# Patient Record
(demographics unavailable — no encounter records)

---

## 2025-07-28 NOTE — HISTORY OF PRESENT ILLNESS
[Home] : at home, [unfilled] , at the time of the visit. [Other Location: e.g. Home (Enter Location, City,State)___] : at [unfilled] [This encounter was initiated by telehealth (audio with video) and converted to telephone (audio only)] : This encounter was initiated by telehealth (audio with video) and converted to telephone (audio only) [Technical] : patient unable to effectively utilize tele-video due to technical issues. [Verbal consent obtained from patient] : the patient, [unfilled] [de-identified] : [facility] VISIT  Ms. Allred is a 57 y/o woman who is being seen today for [facility]. She was recently hospitalized at [facility] from 7/22-7/25/25 for dyspnea >7 days. Admitted and treated for acute on chronic respiratory failure and acute on chronic heart failure. Pulmonology and cardiology consulted: symptoms improved with steroids, oxygen, and a diuretic. Of note, she was found to have hepatomegaly on abdominal US. Discharged home with [facility].  Ms. Allred appears comfortable sitting down, oxygen in place and no apparent distress.  She resides alone with HHA assistance for 2-3 hours on Tuesday and Thursday only. She is disabled and depends on HHA and her Cousin's assistance Denies any chest pain, increased shortness of breath, increased cough/sputum, fever/chills, increased fatigue and/or worsening symptoms of depression.   Medical conditions: #Asthma- diagnosed as a child. Denies any intubations. On albuterol MDI/NEB; denies increased use of rescue medications. Her pulmonologist, Dr. Hernandez. Last PFTs in 10/2023.  #Depression- she reports it as severe. Increased to sertraline 100mg daily, tolerating well. Looking in CBT  #Fall- recent fall- tripped going up the stairs and also fell in the home; isolated events.  #COPD on 3L NC #HFpEF- on lasix BID. Barriers to adherence with diet: financial. Has a working scale in the home and weighs daily.  Follow up with sari, Dr. Herndon #DM- newly diagnosed. A1c 6.3%. On metformin BID. FS this Am- 107.

## 2025-07-28 NOTE — HEALTH RISK ASSESSMENT
[No] : No [Any fall with injury in past year] : Patient reported fall with injury in the past year [Assistive Device] : Patient uses an assistive device [de-identified] : unable to fully assess; she states she is doing well right now.

## 2025-07-28 NOTE — ASSESSMENT
[FreeTextEntry1] : Patient is dealing with financial issues such as facing eviction from appointment and also low SNAP benefits. discussed options for her to talk with her PCP regarding [facility].  Continue the use of walker follow up with recommended behavioral health specialists to start therapy.

## 2025-07-28 NOTE — HEALTH RISK ASSESSMENT
[No] : No [Any fall with injury in past year] : Patient reported fall with injury in the past year [Assistive Device] : Patient uses an assistive device [de-identified] : unable to fully assess; she states she is doing well right now.

## 2025-07-28 NOTE — PHYSICAL EXAM
[No Acute Distress] : no acute distress [Well-Appearing] : well-appearing [Normal Voice/Communication] : normal voice/communication [Normal Affect] : the affect was normal [Alert and Oriented x3] : oriented to person, place, and time [Normal Insight/Judgement] : insight and judgment were intact

## 2025-07-28 NOTE — HISTORY OF PRESENT ILLNESS
[Home] : at home, [unfilled] , at the time of the visit. [Other Location: e.g. Home (Enter Location, City,State)___] : at [unfilled] [This encounter was initiated by telehealth (audio with video) and converted to telephone (audio only)] : This encounter was initiated by telehealth (audio with video) and converted to telephone (audio only) [Technical] : patient unable to effectively utilize tele-video due to technical issues. [Verbal consent obtained from patient] : the patient, [unfilled] [de-identified] : [facility] VISIT  Ms. Allred is a 55 y/o woman who is being seen today for [facility]. She was recently hospitalized at [facility] from 7/22-7/25/25 for dyspnea >7 days. Admitted and treated for acute on chronic respiratory failure and acute on chronic heart failure. Pulmonology and cardiology consulted: symptoms improved with steroids, oxygen, and a diuretic. Of note, she was found to have hepatomegaly on abdominal US. Discharged home with [facility].  Ms. Allred appears comfortable sitting down, oxygen in place and no apparent distress.  She resides alone with HHA assistance for 2-3 hours on Tuesday and Thursday only. She is disabled and depends on HHA and her Cousin's assistance Denies any chest pain, increased shortness of breath, increased cough/sputum, fever/chills, increased fatigue and/or worsening symptoms of depression.   Medical conditions: #Asthma- diagnosed as a child. Denies any intubations. On albuterol MDI/NEB; denies increased use of rescue medications. Her pulmonologist, Dr. Hernandez. Last PFTs in 10/2023.  #Depression- she reports it as severe. Increased to sertraline 100mg daily, tolerating well. Looking in CBT  #Fall- recent fall- tripped going up the stairs and also fell in the home; isolated events.  #COPD on 3L NC #HFpEF- on lasix BID. Barriers to adherence with diet: financial. Has a working scale in the home and weighs daily.  Follow up with sari, Dr. Herndon #DM- newly diagnosed. A1c 6.3%. On metformin BID. FS this Am- 107.

## 2025-07-28 NOTE — REVIEW OF SYSTEMS
[Unsteady Walking] : ataxia [Depression] : depression [Negative] : Genitourinary [Suicidal] : not suicidal

## 2025-07-28 NOTE — COUNSELING
[Fall prevention counseling provided] : Fall prevention counseling provided [Adequate lighting] : Adequate lighting [No throw rugs] : No throw rugs [Use proper foot wear] : Use proper foot wear [Use recommended devices] : Use recommended devices [Engage in a relaxing activity] : Engage in a relaxing activity